# Patient Record
Sex: MALE | Race: WHITE | NOT HISPANIC OR LATINO | Employment: FULL TIME | URBAN - METROPOLITAN AREA
[De-identification: names, ages, dates, MRNs, and addresses within clinical notes are randomized per-mention and may not be internally consistent; named-entity substitution may affect disease eponyms.]

---

## 2023-11-20 ENCOUNTER — HOSPITAL ENCOUNTER (EMERGENCY)
Facility: HOSPITAL | Age: 43
Discharge: HOME | End: 2023-11-20
Attending: EMERGENCY MEDICINE
Payer: COMMERCIAL

## 2023-11-20 ENCOUNTER — APPOINTMENT (EMERGENCY)
Dept: RADIOLOGY | Facility: HOSPITAL | Age: 43
End: 2023-11-20
Payer: COMMERCIAL

## 2023-11-20 VITALS
DIASTOLIC BLOOD PRESSURE: 72 MMHG | BODY MASS INDEX: 33.96 KG/M2 | RESPIRATION RATE: 22 BRPM | HEART RATE: 72 BPM | SYSTOLIC BLOOD PRESSURE: 125 MMHG | WEIGHT: 229.28 LBS | HEIGHT: 69 IN | OXYGEN SATURATION: 97 % | TEMPERATURE: 99.3 F

## 2023-11-20 DIAGNOSIS — R07.89 ATYPICAL CHEST PAIN: Primary | ICD-10-CM

## 2023-11-20 LAB
2HR DELTA HS TROPONIN: 0 NG/L
ALBUMIN SERPL BCP-MCNC: 4.2 G/DL (ref 3.5–5)
ALP SERPL-CCNC: 70 U/L (ref 34–104)
ALT SERPL W P-5'-P-CCNC: 43 U/L (ref 7–52)
ANION GAP SERPL CALCULATED.3IONS-SCNC: 4 MMOL/L
AST SERPL W P-5'-P-CCNC: 23 U/L (ref 13–39)
BASOPHILS # BLD AUTO: 0.02 THOUSANDS/ÂΜL (ref 0–0.1)
BASOPHILS NFR BLD AUTO: 0 % (ref 0–1)
BILIRUB SERPL-MCNC: 0.54 MG/DL (ref 0.2–1)
BUN SERPL-MCNC: 16 MG/DL (ref 5–25)
CALCIUM SERPL-MCNC: 9 MG/DL (ref 8.4–10.2)
CARDIAC TROPONIN I PNL SERPL HS: 5 NG/L
CARDIAC TROPONIN I PNL SERPL HS: 5 NG/L
CHLORIDE SERPL-SCNC: 103 MMOL/L (ref 96–108)
CO2 SERPL-SCNC: 30 MMOL/L (ref 21–32)
CREAT SERPL-MCNC: 0.93 MG/DL (ref 0.6–1.3)
EOSINOPHIL # BLD AUTO: 0.13 THOUSAND/ÂΜL (ref 0–0.61)
EOSINOPHIL NFR BLD AUTO: 2 % (ref 0–6)
ERYTHROCYTE [DISTWIDTH] IN BLOOD BY AUTOMATED COUNT: 11.9 % (ref 11.6–15.1)
GFR SERPL CREATININE-BSD FRML MDRD: 100 ML/MIN/1.73SQ M
GLUCOSE SERPL-MCNC: 98 MG/DL (ref 65–140)
HCT VFR BLD AUTO: 46.1 % (ref 36.5–49.3)
HGB BLD-MCNC: 16.7 G/DL (ref 12–17)
IMM GRANULOCYTES # BLD AUTO: 0.01 THOUSAND/UL (ref 0–0.2)
IMM GRANULOCYTES NFR BLD AUTO: 0 % (ref 0–2)
LYMPHOCYTES # BLD AUTO: 2.49 THOUSANDS/ÂΜL (ref 0.6–4.47)
LYMPHOCYTES NFR BLD AUTO: 37 % (ref 14–44)
MCH RBC QN AUTO: 30.9 PG (ref 26.8–34.3)
MCHC RBC AUTO-ENTMCNC: 36.2 G/DL (ref 31.4–37.4)
MCV RBC AUTO: 85 FL (ref 82–98)
MONOCYTES # BLD AUTO: 0.6 THOUSAND/ÂΜL (ref 0.17–1.22)
MONOCYTES NFR BLD AUTO: 9 % (ref 4–12)
NEUTROPHILS # BLD AUTO: 3.41 THOUSANDS/ÂΜL (ref 1.85–7.62)
NEUTS SEG NFR BLD AUTO: 52 % (ref 43–75)
NRBC BLD AUTO-RTO: 0 /100 WBCS
PLATELET # BLD AUTO: 203 THOUSANDS/UL (ref 149–390)
PMV BLD AUTO: 9.2 FL (ref 8.9–12.7)
POTASSIUM SERPL-SCNC: 4.5 MMOL/L (ref 3.5–5.3)
PROT SERPL-MCNC: 6.6 G/DL (ref 6.4–8.4)
RBC # BLD AUTO: 5.41 MILLION/UL (ref 3.88–5.62)
SODIUM SERPL-SCNC: 137 MMOL/L (ref 135–147)
WBC # BLD AUTO: 6.66 THOUSAND/UL (ref 4.31–10.16)

## 2023-11-20 PROCEDURE — 84484 ASSAY OF TROPONIN QUANT: CPT

## 2023-11-20 PROCEDURE — 85025 COMPLETE CBC W/AUTO DIFF WBC: CPT

## 2023-11-20 PROCEDURE — 36415 COLL VENOUS BLD VENIPUNCTURE: CPT

## 2023-11-20 PROCEDURE — 99285 EMERGENCY DEPT VISIT HI MDM: CPT | Performed by: EMERGENCY MEDICINE

## 2023-11-20 PROCEDURE — 99285 EMERGENCY DEPT VISIT HI MDM: CPT

## 2023-11-20 PROCEDURE — 80053 COMPREHEN METABOLIC PANEL: CPT

## 2023-11-20 PROCEDURE — 71045 X-RAY EXAM CHEST 1 VIEW: CPT

## 2023-11-20 PROCEDURE — 93005 ELECTROCARDIOGRAM TRACING: CPT

## 2023-11-20 NOTE — DISCHARGE INSTRUCTIONS
Your tests are all normal at this time. Rest as needed.   Follow up with your PCP or cardiology as they may want to do a screening stress test.

## 2023-11-20 NOTE — ED NOTES
Pt reported to have CP at work, pain was intermittent and sharp, denies any nausea, dizziness and diaphoresis during the episode. He is AXOX3, denies pain at this time, non smoker. Appears in no distress, resp is even and non labored. Skin is dry and warm to touch. Family hx of MI. Brother  from heart attack at 47 yo.       Pamela Reddy, RN  [phone]

## 2023-11-20 NOTE — ED PROVIDER NOTES
History  Chief Complaint   Patient presents with    Chest Pain     Started with L jaw pain last night into face, started with chest discomfort at work, took 325 mg aspirin at work     45 yo male had a strange pain in left jaw yesterday then today noted substernal chest pain that started about 5 hours ago. Pain is described as sharp and intermittent. Pain non-radiating. No associated symptoms - no SOB, nausea, dizziness, sweating. Pt. Does do physical work. No leg pain. Not a smoker. No h/o HTN, DM, high chol.  + FH - brother  of heart attack at age 46. Pt. Took a 325 mg aspirin prior to arrival.      History provided by:  Patient   used: No    Chest Pain  Associated symptoms: no abdominal pain, no back pain, no cough, no dizziness, no fever, no headache, no nausea, no shortness of breath and not vomiting        None       Past Medical History:   Diagnosis Date    Herniated cervical disc     Lumbar herniated disc        Past Surgical History:   Procedure Laterality Date    HAND SURGERY         History reviewed. No pertinent family history. I have reviewed and agree with the history as documented. E-Cigarette/Vaping    E-Cigarette Use Never User      E-Cigarette/Vaping Substances     Social History     Tobacco Use    Smoking status: Never    Smokeless tobacco: Never   Vaping Use    Vaping Use: Never used   Substance Use Topics    Alcohol use: Not Currently    Drug use: Not Currently       Review of Systems   Constitutional: Negative. Negative for chills and fever. HENT: Negative. Negative for congestion and sore throat. Eyes: Negative. Respiratory: Negative. Negative for cough and shortness of breath. Cardiovascular:  Positive for chest pain. Negative for leg swelling. Gastrointestinal: Negative. Negative for abdominal pain, diarrhea, nausea and vomiting. Genitourinary: Negative. Negative for dysuria, flank pain and hematuria. Musculoskeletal: Negative. Negative for back pain and myalgias. Skin: Negative. Negative for rash and wound. Neurological: Negative. Negative for dizziness, light-headedness and headaches. Psychiatric/Behavioral: Negative. Negative for confusion and hallucinations. The patient is not nervous/anxious. All other systems reviewed and are negative. Physical Exam  Physical Exam  Vitals and nursing note reviewed. Constitutional:       General: He is not in acute distress. Appearance: He is well-developed. He is not ill-appearing or diaphoretic. HENT:      Head: Normocephalic and atraumatic. Eyes:      General: No scleral icterus. Conjunctiva/sclera: Conjunctivae normal.   Cardiovascular:      Rate and Rhythm: Normal rate and regular rhythm. Heart sounds: Normal heart sounds. No murmur heard. Pulmonary:      Effort: Pulmonary effort is normal. No respiratory distress. Breath sounds: Normal breath sounds. Abdominal:      General: Bowel sounds are normal. There is no distension. Palpations: Abdomen is soft. Tenderness: There is no abdominal tenderness. Musculoskeletal:         General: No tenderness or deformity. Normal range of motion. Cervical back: Normal range of motion and neck supple. Right lower leg: No edema. Left lower leg: No edema. Skin:     General: Skin is warm and dry. Coloration: Skin is not pale. Findings: No erythema or rash. Neurological:      General: No focal deficit present. Mental Status: He is alert and oriented to person, place, and time. Cranial Nerves: No cranial nerve deficit.    Psychiatric:         Mood and Affect: Mood normal.         Behavior: Behavior normal.         Vital Signs  ED Triage Vitals [11/20/23 1254]   Temperature Pulse Respirations Blood Pressure SpO2   99.3 °F (37.4 °C) 84 20 147/84 100 %      Temp Source Heart Rate Source Patient Position - Orthostatic VS BP Location FiO2 (%)   Tympanic Monitor Sitting Right arm --      Pain Score       3           Vitals:    11/20/23 1315 11/20/23 1345 11/20/23 1415 11/20/23 1445   BP: 145/86 119/73 107/60 125/72   Pulse: 80 72 74 72   Patient Position - Orthostatic VS:             Visual Acuity      ED Medications  Medications - No data to display    Diagnostic Studies  Results Reviewed       Procedure Component Value Units Date/Time    HS Troponin I 2hr [[id number]]  (Normal) Collected: 11/20/23 1521    Lab Status: Final result Specimen: Blood from Arm, Left Updated: 11/20/23 1548     hs TnI 2hr 5 ng/L      Delta 2hr hsTnI 0 ng/L     HS Troponin I 4hr [[id number]]     Lab Status: No result Specimen: Blood     HS Troponin 0hr (reflex protocol) [[id number]]  (Normal) Collected: 11/20/23 1322    Lab Status: Final result Specimen: Blood from Arm, Left Updated: 11/20/23 1351     hs TnI 0hr 5 ng/L     Comprehensive metabolic panel [[id number]] Collected: 11/20/23 1322    Lab Status: Final result Specimen: Blood from Arm, Left Updated: 11/20/23 1344     Sodium 137 mmol/L      Potassium 4.5 mmol/L      Chloride 103 mmol/L      CO2 30 mmol/L      ANION GAP 4 mmol/L      BUN 16 mg/dL      Creatinine 0.93 mg/dL      Glucose 98 mg/dL      Calcium 9.0 mg/dL      AST 23 U/L      ALT 43 U/L      Alkaline Phosphatase 70 U/L      Total Protein 6.6 g/dL      Albumin 4.2 g/dL      Total Bilirubin 0.54 mg/dL      eGFR 100 ml/min/1.73sq m     Narrative:      [place] guidelines for Chronic Kidney Disease (CKD):     Stage 1 with normal or high GFR (GFR > 90 mL/min/1.73 square meters)    Stage 2 Mild CKD (GFR = 60-89 mL/min/1.73 square meters)    Stage 3A Moderate CKD (GFR = 45-59 mL/min/1.73 square meters)    Stage 3B Moderate CKD (GFR = 30-44 mL/min/1.73 square meters)    Stage 4 Severe CKD (GFR = 15-29 mL/min/1.73 square meters)    Stage 5 End Stage CKD (GFR <15 mL/min/1.73 square meters)  Note: GFR calculation is accurate only with a steady state creatinine    CBC and differential [[id number]] Collected: 11/20/23 1322    Lab Status: Final result Specimen: Blood from Arm, Left Updated: 11/20/23 1329     WBC 6.66 Thousand/uL      RBC 5.41 Million/uL      Hemoglobin 16.7 g/dL      Hematocrit 46.1 %      MCV 85 fL      MCH 30.9 pg      MCHC 36.2 g/dL      RDW 11.9 %      MPV 9.2 fL      Platelets 132 Thousands/uL      nRBC 0 /100 WBCs      Neutrophils Relative 52 %      Immat GRANS % 0 %      Lymphocytes Relative 37 %      Monocytes Relative 9 %      Eosinophils Relative 2 %      Basophils Relative 0 %      Neutrophils Absolute 3.41 Thousands/µL      Immature Grans Absolute 0.01 Thousand/uL      Lymphocytes Absolute 2.49 Thousands/µL      Monocytes Absolute 0.60 Thousand/µL      Eosinophils Absolute 0.13 Thousand/µL      Basophils Absolute 0.02 Thousands/µL                    XR chest 1 view portable   ED Interpretation by Conrad Damon MD (29/15 [id number])   NAD                 Procedures  ECG 12 Lead Documentation Only    Date/Time: 11/20/2023 12:55 PM    Performed by: Conrad Damon MD  Authorized by: Conrad Damon MD    Indications / Diagnosis:  Chest pain  ECG reviewed by me, the ED Provider: yes    Patient location:  ED  Previous ECG:     Previous ECG:  Unavailable  Interpretation:     Interpretation: normal    Rate:     ECG rate:  83    ECG rate assessment: normal    Rhythm:     Rhythm: sinus rhythm    Ectopy:     Ectopy: none    QRS:     QRS axis:  Normal  Conduction:     Conduction: normal    ST segments:     ST segments:  Normal  T waves:     T waves: normal             ED Course             HEART Risk Score      Flowsheet Row Most Recent Value   Heart Score Risk Calculator    History 1 Filed at: 11/20/2023 1554   ECG 0 Filed at: 11/20/2023 1554   Age 0 Filed at: 11/20/2023 1554   Risk Factors 1 Filed at: 11/20/2023 1554   Troponin 0 Filed at: 11/20/2023 1554   HEART Score 2 Filed at: 11/20/2023 1554                                        Medical Decision Making  Differential diagnosis includes but not limited to ACS, GERD, musculoskeletal pain. Will check screening labs, EKG and chest xray. 1550 - evaluation negative, delta trop zero. Pt. Reassured and referred for outpt. Cardiology follow up. Amount and/or Complexity of Data Reviewed  Radiology: independent interpretation performed. Disposition  Final diagnoses:   Atypical chest pain     Time reflects when diagnosis was documented in both MDM as applicable and the Disposition within this note       Time User Action Codes Description Comment    99/79/9041  9:20 PM Riky Joseph Add [O37.83] Atypical chest pain           ED Disposition       ED Disposition   Discharge    Condition   Stable    Date/Time   Mon Nov 20, 2023 1650 [facility] discharge to home/self care.                    Follow-up Information    None         Patient's Medications    No medications on file           PDMP Review       None            ED Provider  Electronically Signed by             Darling Stone MD  [id number]

## 2023-11-21 LAB
ATRIAL RATE: 83 BPM
P AXIS: 54 DEGREES
PR INTERVAL: 144 MS
QRS AXIS: 31 DEGREES
QRSD INTERVAL: 92 MS
QT INTERVAL: 342 MS
QTC INTERVAL: 401 MS
T WAVE AXIS: 5 DEGREES
VENTRICULAR RATE: 83 BPM

## 2023-11-21 PROCEDURE — 93010 ELECTROCARDIOGRAM REPORT: CPT | Performed by: INTERNAL MEDICINE

## 2024-03-06 ENCOUNTER — OFFICE VISIT (OUTPATIENT)
Dept: URGENT CARE | Facility: CLINIC | Age: 44
End: 2024-03-06
Payer: COMMERCIAL

## 2024-03-06 VITALS
TEMPERATURE: 96.6 F | BODY MASS INDEX: 34.7 KG/M2 | WEIGHT: 235 LBS | SYSTOLIC BLOOD PRESSURE: 134 MMHG | OXYGEN SATURATION: 96 % | DIASTOLIC BLOOD PRESSURE: 75 MMHG | RESPIRATION RATE: 18 BRPM | HEART RATE: 90 BPM

## 2024-03-06 DIAGNOSIS — K57.92 DIVERTICULITIS: Primary | ICD-10-CM

## 2024-03-06 PROCEDURE — 99213 OFFICE O/P EST LOW 20 MIN: CPT

## 2024-03-06 RX ORDER — DICYCLOMINE HYDROCHLORIDE 10 MG/1
10 CAPSULE ORAL
Qty: 20 CAPSULE | Refills: 0 | Status: SHIPPED | OUTPATIENT
Start: 2024-03-06 | End: 2024-03-11

## 2024-03-06 RX ORDER — AMOXICILLIN AND CLAVULANATE POTASSIUM 875; 125 MG/1; MG/1
1 TABLET, FILM COATED ORAL EVERY 12 HOURS SCHEDULED
Qty: 14 TABLET | Refills: 0 | Status: SHIPPED | OUTPATIENT
Start: 2024-03-06 | End: 2024-03-13

## 2024-03-06 NOTE — PATIENT INSTRUCTIONS
Initiate clear liquid diet for next 2-3 days then slowly advance to low fiber foods. Take antibiotic as directed for next week. Complete entire course of therapy even if symptoms improve and take medication with food to avoid upset stomach. Take bentyl as directed as needed for pain. Follow-up with PCP/GI  in 3-5 days if no improvement of symptoms. Report to the ER sooner if symptoms worsen (worsening pain or unable to tolerate oral intake).

## 2024-03-06 NOTE — PROGRESS NOTES
[facility]        NAME: Rustam Liang is a 43 y.o. male  : 1980    MRN: [id number]  DATE: 2024  TIME: 10:29 AM    Assessment and Plan   Diverticulitis [K57.92]  1. Diverticulitis flare  amoxicillin-clavulanate (AUGMENTIN) 875-125 mg per tablet    dicyclomine (BENTYL) 10 mg capsule    Ambulatory Referral to Gastroenterology        Physical exam consistent with diverticulitis, antibiotics and bentyl as directed. VSS in clinic, appears in no acute distress. Referral placed to GI for future management. Diverticulitis diet discussed. Educated on establishing care with a PCP or to report to the ER if symptoms worsen. Patient verbalizes understanding and agreeable to plan.    Patient Instructions     Initiate clear liquid diet for next 2-3 days then slowly advance to low fiber foods. Take antibiotic as directed for next week. Complete entire course of therapy even if symptoms improve and take medication with food to avoid upset stomach. Take bentyl as directed as needed for pain. Follow-up with PCP/GI  in 3-5 days if no improvement of symptoms. Report to the ER sooner if symptoms worsen (worsening pain or unable to tolerate oral intake).      Chief Complaint     Chief Complaint   Patient presents with    Abdominal Pain     Started last night, patient reports hx of diverticulitis         History of Present Illness       43 year old male presents for evaluation of LLQ abdominal pain that started last night. He reports a history of diverticulitis diagnosed in 2019 and relates his pain feels similar to that time and he also reports eating cashews last night which he feels may have triggered his symptoms. He denies fevers, nausea, vomiting, or diarrhea but reports constipation since yesterday. He relates he recently removed and has been unable to establish care in the area. He has not tried any interventions for symptoms.     Abdominal Pain  This is a new problem. The current episode started  today. The onset quality is sudden. The problem occurs constantly. The problem has been unchanged. The pain is located in the LLQ. The pain is at a severity of 5/10. The pain is moderate. The quality of the pain is cramping and sharp. The abdominal pain does not radiate. Associated symptoms include constipation. Pertinent negatives include no anorexia, arthralgias, belching, diarrhea, dysuria, fever, flatus, frequency, headaches, hematochezia, hematuria, melena, myalgias, nausea, vomiting or weight loss. The pain is aggravated by eating. The pain is relieved by Nothing. He has tried nothing for the symptoms. The treatment provided no relief. Diverticulitis       Review of Systems   Review of Systems   Constitutional:  Negative for activity change, appetite change, chills, fatigue, fever and weight loss.   Respiratory:  Negative for cough, chest tightness and shortness of breath.    Cardiovascular:  Negative for chest pain.   Gastrointestinal:  Positive for abdominal pain and constipation. Negative for anorexia, diarrhea, flatus, hematochezia, melena, nausea and vomiting.   Genitourinary:  Negative for dysuria, frequency and hematuria.   Musculoskeletal:  Negative for arthralgias, back pain, myalgias and neck pain.   Skin:  Negative for color change and pallor.   Neurological:  Negative for dizziness, light-headedness and headaches.         Current Medications       Current Outpatient Medications:     amoxicillin-clavulanate (AUGMENTIN) 875-125 mg per tablet, Take 1 tablet by mouth every 12 (twelve) hours for 7 days, Disp: 14 tablet, Rfl: 0    dicyclomine (BENTYL) 10 mg capsule, Take 1 capsule (10 mg total) by mouth 4 (four) times a day (before meals and at bedtime) for 5 days, Disp: 20 capsule, Rfl: 0    Current Allergies     Allergies as of 03/06/2024    (No Known Allergies)            The following portions of the patient's history were reviewed and updated as appropriate: allergies, current medications, past  family history, past medical history, past social history, past surgical history and problem list.     Past Medical History:   Diagnosis Date    Herniated cervical disc     Lumbar herniated disc        Past Surgical History:   Procedure Laterality Date    HAND SURGERY         History reviewed. No pertinent family history.      Medications have been verified.        Objective   /75   Pulse 90   Temp (!) 96.6 °F (35.9 °C)   Resp 18   Wt 107 kg (235 lb)   SpO2 96%   BMI 34.70 kg/m²        Physical Exam     Physical Exam  Vitals and nursing note reviewed.   Constitutional:       General: He is awake. He is not in acute distress.     Appearance: Normal appearance. He is well-developed and normal weight.   HENT:      Head: Normocephalic and atraumatic.      Right Ear: Hearing normal.      Left Ear: Hearing normal.      Mouth/Throat:      Lips: Pink.      Mouth: Mucous membranes are moist.   Cardiovascular:      Rate and Rhythm: Normal rate and regular rhythm.      Pulses: Normal pulses.      Heart sounds: Normal heart sounds.   Pulmonary:      Effort: Pulmonary effort is normal.      Breath sounds: Normal breath sounds.   Abdominal:      General: Bowel sounds are normal.      Palpations: Abdomen is soft.      Tenderness: There is abdominal tenderness in the left lower quadrant. There is guarding. There is no right CVA tenderness, left CVA tenderness or rebound.       Musculoskeletal:      Cervical back: Normal range of motion and neck supple.   Skin:     General: Skin is warm and dry.   Neurological:      General: No focal deficit present.      Mental Status: He is alert and oriented to person, place, and time.      GCS: GCS eye subscore is 4. GCS verbal subscore is 5. GCS motor subscore is 6.   Psychiatric:         Mood and Affect: Mood normal.         Behavior: Behavior normal. Behavior is cooperative.         Thought Content: Thought content normal.         Judgment: Judgment normal.

## 2024-04-23 ENCOUNTER — CONSULT (OUTPATIENT)
Dept: GASTROENTEROLOGY | Facility: CLINIC | Age: 44
End: 2024-04-23
Payer: COMMERCIAL

## 2024-04-23 VITALS
HEART RATE: 89 BPM | DIASTOLIC BLOOD PRESSURE: 86 MMHG | WEIGHT: 241 LBS | HEIGHT: 69 IN | SYSTOLIC BLOOD PRESSURE: 134 MMHG | BODY MASS INDEX: 35.7 KG/M2 | OXYGEN SATURATION: 97 %

## 2024-04-23 DIAGNOSIS — R10.11 RUQ PAIN: Primary | ICD-10-CM

## 2024-04-23 DIAGNOSIS — K57.92 DIVERTICULITIS: ICD-10-CM

## 2024-04-23 DIAGNOSIS — K52.9 CHRONIC DIARRHEA: ICD-10-CM

## 2024-04-23 DIAGNOSIS — K21.9 CHRONIC GERD: ICD-10-CM

## 2024-04-23 PROCEDURE — 99204 OFFICE O/P NEW MOD 45 MIN: CPT | Performed by: PHYSICIAN ASSISTANT

## 2024-04-23 NOTE — PROGRESS NOTES
[facility] - Outpatient Consultation  Rustam Liang 43 y.o. male MRN: [id number]  Encounter: [id number]          ASSESSMENT AND PLAN:      Pleasant 43-year-old male with no significant past medical history who presents the office as a new patient due to diverticulitis diagnosed at urgent care last month.    Acute uncomplicated diverticulitis  Chronic diarrhea  Recent episode diagnosed clinically with PCP last month.  He had a prior episode in 2019 documented on CT scan, sigmoid colon without complications.  No prior colonoscopy.  Around 5 bowel movements daily, nonbloody, associated lower abdominal cramping.    -Recommend colonoscopy   -GoLytely bowel prep  -Recommend starting Metamucil 1 tablespoon daily.  - Patient was previously given Bentyl.  I recommend he try this before meals to see if improvement of his abdominal cramping and diarrhea.  If this provides some improvement advised he can reach out for refill.    -Advised patient to reach out if he develops any recurrence of left lower abdominal pain to possibly postpone procedure due to increased risk of complication in the setting of active diverticulitis.    -We will obtain CRP, celiac testing and TSH    -Looser stools possibly in the setting of irritable bowel syndrome, rule out underlying thyroid dysfunction, celiac disease, less likely Crohn's or ulcerative colitis.    3.  Chronic GERD  4. Gallsludge  5. RUQ pain  Reports reflux symptoms for over 5 years.  He uses Tums for this.  Reports diagnosis of gall sludge in the past.  Occasional right upper quadrant discomfort.    -Recommend EGD at the same time as colonoscopy in the setting of chronic GERD, elevated BMI to rule out underlying Smith's esophagus    -Will obtain right upper quadrant ultrasound to assess for gallstones or sludge.  Consider surgical referral if present    -Will hold off on antacid medication and await results of EGD    -Continue efforts at healthy  diet and healthy weight loss        I obtained informed consent from the patient. The risks/benefits/alternatives of the procedure were discussed with the patient. Risks included, but not limited to, infection, bleeding, perforation, injury to organs in the abdomen, missed lesion and incomplete procedure were discussed. Patient was agreeable and electronic signature was obtained.    Patient was recommended to follow up after procedures. Patient was recommended to reach out via [employer] with any questions or concerns in the meantime.   ______________________________________________________________________    HPI:      Rustam Liang is a 43 y.o. male with no significant past medical history presents the office as a new patient for diverticulitis.    Patient's diagnosed at urgent care last month with physical exam consistent with diverticulitis.  He was given course of Augmentin.  He had a previous episode of diverticulitis in 2019 which is documented on CT scan.  No complications at that time.    Patient reports he is doing well at this time.  He does report having more looser bowel movements with typically around 5 a day.  This has been over the past few years since his brother unfortunately passed away.  He reports significant stressors recently.  Typically occurs after eating.  No blood.  No significant nighttime symptoms.  Reports some associated lower abdominal cramping.  No specific association with food intake such as dairy.    He also reports being diagnosed with gall sludge in the past.  He denies seeing a surgeon for this.  He reports occasionally having right-sided pain which typically occurs after eating a large meal.  Rare NSAID use.  Reports reflux symptoms which have been going on for over 5 years.  He uses Tums for this.    Reports significant family history of cancer on his father sides.  GI cancers include gastric cancer in his maternal uncle.  No known colon cancer.    Most recent lab work showed  "normal CBC and CMP.    Denies smoking or alcohol use.    No prior abdominal surgeries.    No EGD or colonoscopy.    REVIEW OF SYSTEMS:    CONSTITUTIONAL: Denies any fever, chills, rigors, and weight loss.  HEENT: No earache or tinnitus. Denies hearing loss or visual disturbances.  CARDIOVASCULAR: No chest pain or palpitations.   RESPIRATORY: Denies any cough, hemoptysis, shortness of breath or dyspnea on exertion.  GASTROINTESTINAL: As noted in the History of Present Illness.   GENITOURINARY: No problems with urination. Denies any hematuria or dysuria.  NEUROLOGIC: No dizziness or vertigo, denies headaches.   MUSCULOSKELETAL: Denies any muscle or joint pain.   SKIN: Denies skin rashes or itching.   ENDOCRINE: Denies excessive thirst. Denies intolerance to heat or cold.  PSYCHOSOCIAL: Denies depression or anxiety. Denies any recent memory loss.       Historical Information   Past Medical History:   Diagnosis Date    Diverticulitis     Herniated cervical disc     Lumbar herniated disc      Past Surgical History:   Procedure Laterality Date    HAND SURGERY       Social History   Social History     Substance and Sexual Activity   Alcohol Use Not Currently     Social History     Substance and Sexual Activity   Drug Use Not Currently     Social History     Tobacco Use   Smoking Status Never   Smokeless Tobacco Never     Family History   Problem Relation Age of Onset    Breast cancer Mother     Lung cancer Father     Heart attack Brother        Meds/Allergies       Current Outpatient Medications:     polyethylene glycol (GOLYTELY) 4000 mL solution    No Known Allergies        Objective     Blood pressure 134/86, pulse 89, height 5' 9\" (1.753 m), weight 109 kg (241 lb), SpO2 97%. Body mass index is 35.59 kg/m².        PHYSICAL EXAM:      General Appearance:   Alert, cooperative, no distress   HEENT:   Normocephalic, atraumatic, anicteric.     Neck:  Supple, symmetrical, trachea midline   Lungs:   Clear to auscultation " bilaterally; no rales, rhonchi or wheezing; respirations unlabored    Heart::   Regular rate and rhythm; no murmur, rub, or gallop.   Abdomen:   Soft, non-tender, non-distended; normal bowel sounds; no masses, no organomegaly. Benign abdomen.    Genitalia:   Deferred    Rectal:   Deferred    Extremities:  No cyanosis, clubbing or edema    Pulses:  2+ and symmetric    Skin:  No jaundice, rashes, or lesions    Lymph nodes:  No palpable cervical lymphadenopathy        Lab Results:   No visits with results within 1 Day(s) from this visit.   Latest known visit with results is:   Admission on 11/20/2023, Discharged on 11/20/2023   Component Date Value    WBC 11/20/2023 6.66     RBC 11/20/2023 5.41     Hemoglobin 11/20/2023 16.7     Hematocrit 11/20/2023 46.1     MCV 11/20/2023 85     MCH 11/20/2023 30.9     MCHC 11/20/2023 36.2     RDW 11/20/2023 11.9     MPV 11/20/2023 9.2     Platelets 11/20/2023 203     nRBC 11/20/2023 0     Segmented % 11/20/2023 52     Immature Grans % 11/20/2023 0     Lymphocytes % 11/20/2023 37     Monocytes % 11/20/2023 9     Eosinophils Relative 11/20/2023 2     Basophils Relative 11/20/2023 0     Absolute Neutrophils 11/20/2023 3.41     Absolute Immature Grans 11/20/2023 0.01     Absolute Lymphocytes 11/20/2023 2.49     Absolute Monocytes 11/20/2023 0.60     Eosinophils Absolute 11/20/2023 0.13     Basophils Absolute 11/20/2023 0.02     Sodium 11/20/2023 137     Potassium 11/20/2023 4.5     Chloride 11/20/2023 103     CO2 11/20/2023 30     ANION GAP 11/20/2023 4     BUN 11/20/2023 16     Creatinine 11/20/2023 0.93     Glucose 11/20/2023 98     Calcium 11/20/2023 9.0     AST 11/20/2023 23     ALT 11/20/2023 43     Alkaline Phosphatase 11/20/2023 70     Total Protein 11/20/2023 6.6     Albumin 11/20/2023 4.2     Total Bilirubin 11/20/2023 0.54     eGFR 11/20/2023 100     hs TnI 0hr 11/20/2023 5     hs TnI 2hr 11/20/2023 5     Delta 2hr hsTnI 11/20/2023 0     Ventricular Rate 11/20/2023 83      Atrial Rate 11/20/2023 83     DC Interval 11/20/2023 144     QRSD Interval 11/20/2023 92     QT Interval 11/20/2023 342     QTC Interval 11/20/2023 401     P Axis 11/20/2023 54     QRS [place] 11/20/2023 31     T Wave [place] 11/20/2023 5          Radiology Results:   No results found.